# Patient Record
Sex: MALE | Race: WHITE | NOT HISPANIC OR LATINO | URBAN - NONMETROPOLITAN AREA
[De-identification: names, ages, dates, MRNs, and addresses within clinical notes are randomized per-mention and may not be internally consistent; named-entity substitution may affect disease eponyms.]

---

## 2019-05-10 ENCOUNTER — IMPORTED ENCOUNTER (OUTPATIENT)
Dept: URBAN - NONMETROPOLITAN AREA CLINIC 1 | Facility: CLINIC | Age: 59
End: 2019-05-10

## 2019-05-10 PROBLEM — H02.831: Noted: 2019-05-10

## 2019-05-10 PROBLEM — H52.4: Noted: 2019-05-10

## 2019-05-10 PROBLEM — H43.812: Noted: 2019-05-10

## 2019-05-10 PROBLEM — H02.834: Noted: 2019-05-10

## 2019-05-10 PROBLEM — H25.13: Noted: 2019-05-10

## 2019-05-10 PROCEDURE — 92014 COMPRE OPH EXAM EST PT 1/>: CPT

## 2019-05-10 PROCEDURE — 92015 DETERMINE REFRACTIVE STATE: CPT

## 2019-05-10 NOTE — PATIENT DISCUSSION
Presbyopia OUDiscussed refractive status in detail with patient. New glasses Rx given today. Continue to monitor. Cataracts OU:Discussed diagnosis with patient. Reviewed symptoms related to cataract progression. Discussed various treatment options with patient. No treatment required at this time. Continue to monitor. Dermatochalasis OUDiscussed diagnosis in detail w/ pt today. No superior field of vision complaint noted. Continue to monitor. PVD OSDiscussed findings of exam in detail with the patient. The risk of retinal detachment in patients with PVDs was discussed with the patient and the warning signs of retinal detachment were carefully reviewed with the patient. The patient was warned to return to the office or contact the ophthalmologist on call immediately if they experience signs of retinal detachment. Continue to monitor.; 's Notes:   5/10/19DFE  5/10/19

## 2020-07-03 ENCOUNTER — IMPORTED ENCOUNTER (OUTPATIENT)
Dept: URBAN - NONMETROPOLITAN AREA CLINIC 1 | Facility: CLINIC | Age: 60
End: 2020-07-03

## 2020-07-03 PROCEDURE — 92015 DETERMINE REFRACTIVE STATE: CPT

## 2020-07-03 PROCEDURE — 92014 COMPRE OPH EXAM EST PT 1/>: CPT

## 2021-07-09 ENCOUNTER — IMPORTED ENCOUNTER (OUTPATIENT)
Dept: URBAN - NONMETROPOLITAN AREA CLINIC 1 | Facility: CLINIC | Age: 61
End: 2021-07-09

## 2021-07-09 PROCEDURE — 92015 DETERMINE REFRACTIVE STATE: CPT

## 2021-07-09 PROCEDURE — 92014 COMPRE OPH EXAM EST PT 1/>: CPT

## 2021-07-09 NOTE — PATIENT DISCUSSION
Presbyopia OUDiscussed refractive status in detail with patient. New glasses Rx given today. Continue to monitor. Cataracts OU:Discussed diagnosis with patient. Reviewed symptoms related to cataract progression. Discussed various treatment options with patient. No treatment required at this time. Continue to monitor. Dermatochalasis OUDiscussed diagnosis in detail w/ pt today. No superior field of vision complaint noted. Continue to monitor. PVD OSDiscussed findings of exam in detail with the patient. The risk of retinal detachment in patients with PVDs was discussed with the patient and the warning signs of retinal detachment were carefully reviewed with the patient. The patient was warned to return to the office or contact the ophthalmologist on call immediately if they experience signs of retinal detachment. Continue to monitor.

## 2022-04-09 ASSESSMENT — TONOMETRY
OS_IOP_MMHG: 16
OS_IOP_MMHG: 18
OD_IOP_MMHG: 18
OD_IOP_MMHG: 16
OS_IOP_MMHG: 16
OD_IOP_MMHG: 16

## 2022-04-09 ASSESSMENT — VISUAL ACUITY
OD_SC: 20/25+
OS_SC: 20/20
OS_SC: 20/20-
OD_SC: 20/20
OD_SC: 20/20
OS_SC: 20/20

## 2022-09-12 ENCOUNTER — ESTABLISHED PATIENT (OUTPATIENT)
Dept: URBAN - NONMETROPOLITAN AREA CLINIC 1 | Facility: CLINIC | Age: 62
End: 2022-09-12

## 2022-09-12 DIAGNOSIS — H52.4: ICD-10-CM

## 2022-09-12 PROCEDURE — 92014 COMPRE OPH EXAM EST PT 1/>: CPT

## 2022-09-12 PROCEDURE — 92015 DETERMINE REFRACTIVE STATE: CPT

## 2022-09-12 ASSESSMENT — TONOMETRY
OS_IOP_MMHG: 16
OD_IOP_MMHG: 16

## 2022-09-12 ASSESSMENT — VISUAL ACUITY
OD_CC: 20/25+1
OS_CC: 20/25-2

## 2023-09-14 ENCOUNTER — ESTABLISHED PATIENT (OUTPATIENT)
Dept: URBAN - NONMETROPOLITAN AREA CLINIC 1 | Facility: CLINIC | Age: 63
End: 2023-09-14

## 2023-09-14 DIAGNOSIS — H52.4: ICD-10-CM

## 2023-09-14 PROCEDURE — 92015 DETERMINE REFRACTIVE STATE: CPT

## 2023-09-14 PROCEDURE — 92014 COMPRE OPH EXAM EST PT 1/>: CPT

## 2023-09-14 ASSESSMENT — TONOMETRY
OS_IOP_MMHG: 16
OD_IOP_MMHG: 16

## 2023-09-14 ASSESSMENT — VISUAL ACUITY
OS_CC: 20/20
OU_CC: 20/20
OD_CC: 20/20-1

## 2024-09-16 ENCOUNTER — COMPREHENSIVE EXAM (OUTPATIENT)
Dept: URBAN - NONMETROPOLITAN AREA CLINIC 1 | Facility: CLINIC | Age: 64
End: 2024-09-16

## 2024-09-16 DIAGNOSIS — H52.4: ICD-10-CM

## 2024-09-16 DIAGNOSIS — H25.13: ICD-10-CM

## 2024-09-16 DIAGNOSIS — H43.812: ICD-10-CM

## 2024-09-16 DIAGNOSIS — H02.831: ICD-10-CM

## 2024-09-16 PROCEDURE — 92015 DETERMINE REFRACTIVE STATE: CPT

## 2024-09-16 PROCEDURE — 92014 COMPRE OPH EXAM EST PT 1/>: CPT
